# Patient Record
Sex: FEMALE | Race: WHITE | NOT HISPANIC OR LATINO | Employment: FULL TIME | URBAN - METROPOLITAN AREA
[De-identification: names, ages, dates, MRNs, and addresses within clinical notes are randomized per-mention and may not be internally consistent; named-entity substitution may affect disease eponyms.]

---

## 2020-04-15 ENCOUNTER — TELEPHONE (OUTPATIENT)
Dept: URGENT CARE | Facility: CLINIC | Age: 45
End: 2020-04-15

## 2020-04-15 DIAGNOSIS — Z76.0 MEDICATION REFILL: Primary | ICD-10-CM

## 2020-04-15 RX ORDER — GABAPENTIN 600 MG/1
600 TABLET ORAL 3 TIMES DAILY
Qty: 90 TABLET | Refills: 0 | Status: SHIPPED | OUTPATIENT
Start: 2020-04-15 | End: 2020-05-15

## 2020-04-15 NOTE — TELEPHONE ENCOUNTER
This was a patient I evaluated via telemedicine ([facility]). There was an error on the anywhere care platform and I was not able to prescribe gabapentin refill. I have created this encounter to send the rx electronically. See telemedicine note from our encounter.

## 2024-07-26 ENCOUNTER — OFFICE VISIT (OUTPATIENT)
Dept: URGENT CARE | Facility: CLINIC | Age: 49
End: 2024-07-26
Payer: OTHER MISCELLANEOUS

## 2024-07-26 ENCOUNTER — OFFICE VISIT (OUTPATIENT)
Dept: URGENT CARE | Facility: CLINIC | Age: 49
End: 2024-07-26
Payer: COMMERCIAL

## 2024-07-26 VITALS
RESPIRATION RATE: 16 BRPM | DIASTOLIC BLOOD PRESSURE: 64 MMHG | OXYGEN SATURATION: 99 % | HEIGHT: 67 IN | TEMPERATURE: 98 F | BODY MASS INDEX: 31.08 KG/M2 | SYSTOLIC BLOOD PRESSURE: 122 MMHG | HEART RATE: 89 BPM | WEIGHT: 198 LBS

## 2024-07-26 VITALS
HEART RATE: 93 BPM | HEIGHT: 67 IN | BODY MASS INDEX: 30.93 KG/M2 | TEMPERATURE: 98 F | SYSTOLIC BLOOD PRESSURE: 126 MMHG | DIASTOLIC BLOOD PRESSURE: 68 MMHG | WEIGHT: 197.06 LBS | OXYGEN SATURATION: 97 % | RESPIRATION RATE: 18 BRPM

## 2024-07-26 DIAGNOSIS — S16.1XXA STRAIN OF NECK MUSCLE, INITIAL ENCOUNTER: ICD-10-CM

## 2024-07-26 DIAGNOSIS — Z02.6 ENCOUNTER RELATED TO WORKER'S COMPENSATION CLAIM: Primary | ICD-10-CM

## 2024-07-26 DIAGNOSIS — R51.9 ACUTE NONINTRACTABLE HEADACHE, UNSPECIFIED HEADACHE TYPE: ICD-10-CM

## 2024-07-26 RX ORDER — CYCLOBENZAPRINE HCL 5 MG
5 TABLET ORAL 3 TIMES DAILY PRN
Qty: 15 TABLET | Refills: 0 | Status: SHIPPED | OUTPATIENT
Start: 2024-07-26

## 2024-07-26 RX ORDER — BUSPIRONE HYDROCHLORIDE 15 MG/1
TABLET ORAL
COMMUNITY
Start: 2023-09-24

## 2024-07-26 NOTE — PROGRESS NOTES
"Subjective:      Patient ID: Xena Nunn is a 49 y.o. female.    Vitals:  height is 5' 7" (1.702 m) and weight is 89.4 kg (197 lb 1.5 oz). Her oral temperature is 98.3 °F (36.8 °C). Her blood pressure is 126/68 and her pulse is 93. Her respiration is 18 and oxygen saturation is 97%.     Chief Complaint: Shoulder Pain (Right shoulder pain from injury last night at work.)    Patient presents today with right shoulder pain following her catching a patients head from hitting the ground last night at work. She had a shoulder surgery on the right shoulder 8 weeks ago. She states that she took some left over pain meds from surgery and it just put her to sleep.    Shoulder Pain   The pain is present in the right shoulder and neck. This is a new problem. The current episode started yesterday. There has been a history of trauma. The problem occurs constantly. The problem has been unchanged. The quality of the pain is described as sharp. The pain is at a severity of 6/10. The pain is moderate. Associated symptoms include a limited range of motion and stiffness. She has tried cold, NSAIDS and acetaminophen (tylenol, ibuprofen, norco, percocet) for the symptoms. The treatment provided mild relief.     Musculoskeletal:  Positive for abnormal ROM of joint.    Objective:     Physical Exam    Assessment:     No diagnosis found.    Plan:       There are no diagnoses linked to this encounter.                  "

## 2024-07-26 NOTE — PATIENT INSTRUCTIONS
You may return to work with no restrictions.  Restrictions: Regular Duty  Follow up if symptoms worsen or fail to improve.    The medication you have been prescribed may cause drowsiness and impair your judgement. Therefore, you should avoid driving, climbing, using machinery, etc., so as not to increase your risk of injury. Do NOT drink any alcohol while on this medication(s).     Apply RICE technique:   REST, ICE, COMPRESSION, ELEVATION.  Use ice to area for 20 minutes 3-4 times a day for comfort. Can also rotate with use of heat for 20 minutes.   No heavy lifting.  Neck stretches as needed for pain relief.  Take Tylenol and/or Ibuprofen (if applicable) as needed for pain.  Apply analgesic rubs or patches such as biofreeze or Tiger Balm.  Go to ER if you have fever of 103 or higher, numbness, tingling, weakness to lower extremities, worsening HA, confusion, slurred speech or if your symptoms worsen in any way.    Follow up with Orthopedics or primary care within 2 weeks if symptoms do not improve.

## 2024-07-26 NOTE — LETTER
[facility]  [address]  [place] [address]  Phone: [phone]  [facility]: [phone]    Pt Name: Xena Nunn  Injury Date: 07/25/2024   Employee ID: [id number] Date of First Treatment: 07/26/2024   Company: [employer] reference to record [employer]      Appointment Time: 04:20 PM Arrived: 4:25 PM   Provider: Bernice Joseph PA-C Time Out: 5:35 PM     Office Treatment:   1. Encounter related to worker's compensation claim    2. Strain of neck muscle, initial encounter    3. Acute nonintractable headache, unspecified headache type      Medications Ordered This Encounter   Medications    cyclobenzaprine (FLEXERIL) 5 MG tablet

## 2024-07-26 NOTE — PROGRESS NOTES
Subjective:      Patient ID: Xena Nunn is a 49 y.o. female.    Chief Complaint: Shoulder Injury    Patient's place of employment - [employer]  Patient's job title -   Date of injury - 07/25/2024  Body part injured including left or right - right shoulder/neck  Injury Mechanism - pulling  What they were doing when they got hurt - preventing a patient's head from falling   What they did immediately after - told supervisor  Pain scale right now - 6    Patient notes she is mostly having R sided neck pain. Some shoulder pain but very minimal as she had shoulder surgery 2 months ago. Associated sxs include HA. Occipital HA that is dull/aching in nature. Denies vision changes. Denies numbness/tingling. Prior Tx includes 800 mg of ibuprofen, tylenol, and half of unknown opioid from surgery.    Shoulder Injury   The incident occurred at work. The right shoulder is affected. The incident occurred 12 to 24 hours ago. The injury mechanism was a direct blow. The quality of the pain is described as aching. The pain radiates to the right neck. The pain is at a severity of 6/10. The pain is moderate. Pertinent negatives include no chest pain, muscle weakness, numbness or tingling. The symptoms are aggravated by movement and overhead lifting. She has tried acetaminophen, NSAIDs and ice (Narcotic pain meds) for the symptoms. The treatment provided no relief.       Constitution: Negative for chills and fever.   Neck: Positive for neck pain.   Cardiovascular:  Negative for chest pain.   Eyes:  Negative for vision loss, double vision and blurred vision.   Musculoskeletal:  Positive for pain (R shoulder). Negative for trauma, joint pain, joint swelling, abnormal ROM of joint, muscle cramps and muscle ache.   Neurological:  Negative for numbness and tingling.     Objective:     Physical Exam  Vitals and nursing note reviewed.   Constitutional:       General: She is not in acute distress.     Appearance: Normal appearance. She  is not ill-appearing or diaphoretic.   HENT:      Head: Normocephalic and atraumatic.      Right Ear: External ear normal.      Left Ear: External ear normal.      Nose: Nose normal.   Eyes:      General: No scleral icterus.     Extraocular Movements: Extraocular movements intact.      Pupils: Pupils are equal, round, and reactive to light.   Neck:      Comments: R cervical paraspinal tenderness to palpation. No midline tenderness. Full ROM with pain. No rigidity. Negative Kernig's.   Cardiovascular:      Rate and Rhythm: Normal rate and regular rhythm.      Pulses: Normal pulses.      Heart sounds: Normal heart sounds.   Pulmonary:      Effort: Pulmonary effort is normal.      Breath sounds: Normal breath sounds.   Musculoskeletal:         General: No swelling or deformity.      Cervical back: Normal range of motion and neck supple. Tenderness present. No rigidity.      Comments: Mild R shoulder tenderness. Full ROM. No deformity. Sensation intact. 2+ radial pulse. NVI.   Lymphadenopathy:      Cervical: No cervical adenopathy.   Skin:     General: Skin is warm and dry.      Coloration: Skin is not jaundiced.      Findings: No bruising.   Neurological:      General: No focal deficit present.      Mental Status: She is alert.   Psychiatric:         Mood and Affect: Mood normal.        Assessment:      1. Encounter related to worker's compensation claim    2. Strain of neck muscle, initial encounter    3. Acute nonintractable headache, unspecified headache type      Plan:     Encounter related to worker's compensation claim  -     [facility]DOT DR[facility] SCREEN    Strain of neck muscle, initial encounter  -     cyclobenzaprine (FLEXERIL) 5 MG tablet; Take 1 tablet (5 mg total) by mouth 3 (three) times daily as needed for Muscle spasms.  Dispense: 15 tablet; Refill: 0    Acute nonintractable headache, unspecified headache type        Medications Ordered This Encounter   Medications    cyclobenzaprine (FLEXERIL) 5 MG tablet      Sig: Take 1 tablet (5 mg total) by mouth 3 (three) times daily as needed for Muscle spasms.     Dispense:  15 tablet     Refill:  0           Based off of patient's presentation in clinic today, treatment in clinic was necessary. Patient can take OTC Acetaminophen (Tylenol) and/or Ibuprofen (Motrin) as needed for pain relief. Will encourage rest, ice, ibuprofen as well as Flexeril muscle relaxant. Se will be allowed to work regular duty without new restrictions (as she is on a 2lb restriction from previous surgery) starting tomorrow. Patient may RTC as needed.

## 2024-07-28 ENCOUNTER — TELEPHONE (OUTPATIENT)
Dept: URGENT CARE | Facility: CLINIC | Age: 49
End: 2024-07-28
Payer: COMMERCIAL

## 2024-07-28 ENCOUNTER — HOSPITAL ENCOUNTER (OUTPATIENT)
Dept: RADIOLOGY | Facility: CLINIC | Age: 49
Discharge: HOME | End: 2024-07-28
Attending: PHYSICIAN ASSISTANT
Payer: COMMERCIAL

## 2024-07-28 ENCOUNTER — OFFICE VISIT (OUTPATIENT)
Dept: URGENT CARE | Facility: CLINIC | Age: 49
End: 2024-07-28
Payer: OTHER MISCELLANEOUS

## 2024-07-28 VITALS
RESPIRATION RATE: 18 BRPM | DIASTOLIC BLOOD PRESSURE: 69 MMHG | BODY MASS INDEX: 31.08 KG/M2 | TEMPERATURE: 99 F | WEIGHT: 198 LBS | HEIGHT: 67 IN | SYSTOLIC BLOOD PRESSURE: 126 MMHG | OXYGEN SATURATION: 97 % | HEART RATE: 96 BPM

## 2024-07-28 DIAGNOSIS — R07.89 MUSCULOSKELETAL CHEST PAIN: ICD-10-CM

## 2024-07-28 DIAGNOSIS — M89.8X1 PAIN OF RIGHT CLAVICLE: ICD-10-CM

## 2024-07-28 DIAGNOSIS — M25.511 ACUTE PAIN OF RIGHT SHOULDER: Primary | ICD-10-CM

## 2024-07-28 PROCEDURE — 73030 X-RAY EXAM OF SHOULDER: CPT | Mod: RT,S$GLB,, | Performed by: RADIOLOGY

## 2024-07-28 PROCEDURE — 73000 X-RAY EXAM OF COLLAR BONE: CPT | Mod: RT,S$GLB,, | Performed by: RADIOLOGY

## 2024-07-28 RX ORDER — METHOCARBAMOL 750 MG/1
750 TABLET, FILM COATED ORAL 4 TIMES DAILY PRN
Qty: 28 TABLET | Refills: 0 | Status: SHIPPED | OUTPATIENT
Start: 2024-07-28 | End: 2024-07-28 | Stop reason: SDUPTHER

## 2024-07-28 RX ORDER — METHOCARBAMOL 750 MG/1
750 TABLET, FILM COATED ORAL 4 TIMES DAILY PRN
Qty: 28 TABLET | Refills: 0 | Status: SHIPPED | OUTPATIENT
Start: 2024-07-28 | End: 2024-08-04

## 2024-07-28 NOTE — TELEPHONE ENCOUNTER
Call to inform of shoulder and clavicle xray results. No acute findings.ID confirmed with name and . Went over results. No further questions. Pt also req the robaxin be sent to [facility].

## 2024-07-28 NOTE — PROGRESS NOTES
"Subjective:      Patient ID: Xena Nunn is a 49 y.o. female.    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 She was seen here on 7/26 but states she is back requesting xrays. She states that 3 days ago an elderly resident was flailing in wheelchair. The patient herself grabbed the resident with both hands to prevent her from falling. There was a pulling injury mechanism to shoulder and arm. C/o right shoulder pain, right clavicle pain and right chest wall pain. States had neck pain initially but it has resolved. There is no arm numbness,tingling, or weakness. Denies difficulty with ROM. She is concerned bc she had a right shoulder surgery several months ago and wants area evaluated ("grade 5 AC joint separation and distal clavicle fracture")    Shoulder Injury   The incident occurred at work. The right shoulder is affected. The incident occurred 3 to 5 days ago. The injury mechanism was a twisting injury. The quality of the pain is described as stabbing. The pain does not radiate. The pain is at a severity of 6/10. The pain is moderate. Pertinent negatives include no numbness. The symptoms are aggravated by overhead lifting and movement. Treatments tried: tylenol, ibuprofen, flexaril. The treatment provided no relief.       Musculoskeletal:  Positive for pain, trauma and joint pain. Negative for joint swelling, abnormal ROM of joint and history of spine disorder.   Neurological:  Negative for passing out, facial drooping, headaches, numbness and tingling.     Objective:     Physical Exam  Vitals and nursing note reviewed.   Constitutional:       General: She " is not in acute distress.     Appearance: She is well-developed. She is not ill-appearing or toxic-appearing.   HENT:      Head: Normocephalic and atraumatic.      Nose: No congestion.      Mouth/Throat:      Pharynx: No oropharyngeal exudate or posterior oropharyngeal erythema.   Eyes:      Extraocular Movements: Extraocular movements intact.      Pupils: Pupils are equal, round, and reactive to light.   Cardiovascular:      Rate and Rhythm: Normal rate and regular rhythm.      Heart sounds: Normal heart sounds.      Comments: Bilateral UE 2+ radial and ulnar pulses  Pulmonary:      Effort: Pulmonary effort is normal. No respiratory distress.      Breath sounds: Normal breath sounds.   Abdominal:      General: Bowel sounds are normal.      Palpations: Abdomen is soft.      Tenderness: There is no abdominal tenderness. There is no guarding or rebound.   Musculoskeletal:         General: No deformity. Normal range of motion.      Cervical back: Normal range of motion and neck supple.      Comments: 5/5 strength 2+ reflexes bilateral UE    Right shoulder full active and passive ROM. No deformities.    Mid clavicle mild TTP no skin tenting or step offs    Right mid chest wall MSK TTP. No crepitus or deformity     No midline or MSK c spine TTP. FROM C spine    Skin:     General: Skin is warm and dry.   Neurological:      Mental Status: She is alert and oriented to person, place, and time.   Psychiatric:         Behavior: Behavior normal.      XR reviewed and interpreted  by myself - no shoulder dislocation, subluxation, or fx. Old distal clavicle fx. No acute fx     Assessment:      1. Acute pain of right shoulder    2. Pain of right clavicle    3. Musculoskeletal chest pain      Plan:     Discussed xrays. Explained that provider would reach out with offical xray read when available.     Pt is requesting a different muscle relaxer - states flexeril not helping. Robaxin sent in place. Continue ibuprofen.    Medications  Ordered This Encounter   Medications    methocarbamoL (ROBAXIN) 750 MG Tab     Sig: Take 1 tablet (750 mg total) by mouth 4 (four) times daily as needed (spasm).     Dispense:  28 tablet     Refill:  0   Caroline Fusilier PA-C [facility]         Patient Instructions   Continue IBUPROFEN 800MG and Flexeril as needed for muscle spasm.          No follow-ups on file.

## 2024-12-12 ENCOUNTER — ON-DEMAND VIRTUAL (OUTPATIENT)
Dept: URGENT CARE | Facility: CLINIC | Age: 49
End: 2024-12-12
Payer: COMMERCIAL

## 2024-12-12 DIAGNOSIS — R05.9 COUGH, UNSPECIFIED TYPE: ICD-10-CM

## 2024-12-12 DIAGNOSIS — J32.9 SINUSITIS, UNSPECIFIED CHRONICITY, UNSPECIFIED LOCATION: Primary | ICD-10-CM

## 2024-12-12 RX ORDER — BENZONATATE 100 MG/1
100 CAPSULE ORAL 3 TIMES DAILY PRN
Qty: 60 CAPSULE | Refills: 0 | Status: SHIPPED | OUTPATIENT
Start: 2024-12-12 | End: 2025-01-01

## 2024-12-12 RX ORDER — PROMETHAZINE HYDROCHLORIDE AND DEXTROMETHORPHAN HYDROBROMIDE 6.25; 15 MG/5ML; MG/5ML
5 SYRUP ORAL NIGHTLY PRN
Qty: 35 ML | Refills: 0 | Status: SHIPPED | OUTPATIENT
Start: 2024-12-12 | End: 2024-12-19

## 2024-12-12 RX ORDER — AZITHROMYCIN 250 MG/1
TABLET, FILM COATED ORAL
Qty: 6 TABLET | Refills: 0 | Status: SHIPPED | OUTPATIENT
Start: 2024-12-12

## 2024-12-12 NOTE — PROGRESS NOTES
Subjective:      Patient ID: Xena Nunn is a 49 y.o. female.    Vitals:  vitals were not taken for this visit.     Chief Complaint: Cough, Otalgia, and Sinus Problem      Visit Type: TELE AUDIOVISUAL    Present with the patient at the time of consultation: TELEMED PRESENT WITH PATIENT: family member, patient in car    Past Medical History:   Diagnosis Date    Anxiety     Asthma     Essential tremor     Neuropathy      Past Surgical History:   Procedure Laterality Date     SECTION  2010    FACIAL RECONSTRUCTION SURGERY      GASTRIC BYPASS  2003    SHOULDER SURGERY Right 2024    TUBAL LIGATION  2006    TUBAL LIGATION  2011    tubal reversal  2009     Review of patient's allergies indicates:   Allergen Reactions    Bactrim [sulfamethoxazole-trimethoprim]     Doxofylline     Pcn [penicillins]     Sulfa (sulfonamide antibiotics)     Tetracyclines      Current Outpatient Medications on File Prior to Visit   Medication Sig Dispense Refill    busPIRone (BUSPAR) 15 MG tablet       cyclobenzaprine (FLEXERIL) 5 MG tablet Take 1 tablet (5 mg total) by mouth 3 (three) times daily as needed for Muscle spasms. 15 tablet 0    fexofenadine (ALLEGRA) 180 MG tablet Take 180 mg by mouth once daily.      gabapentin (NEURONTIN) 300 MG capsule Take 1 capsule (300 mg total) by mouth 3 (three) times daily. 90 capsule 0    guaifenesin (MUCINEX) 600 mg 12 hr tablet Take 1,200 mg by mouth 2 (two) times daily.      hydrOXYzine HCl (ATARAX) 10 MG Tab TAKE 1 TABLET TWICE PER DAY FOR ANXIETY/ ITCHING  0    lorazepam (ATIVAN) 1 MG tablet TAKE 1 TABLET TWICE A DAY AS NEEDED FOR ANXIETY (Patient not taking: Reported on 2024)  2    nortriptyline (PAMELOR) 50 MG capsule Take 1 capsule (50 mg total) by mouth every evening. (Patient taking differently: Take 50 mg by mouth 2 (two) times daily.) 30 capsule 0    predniSONE (DELTASONE) 5 MG tablet Day 1-2: take 1 tablet by oral route every 6 hours  Day 3-4:  take 1 tablet by oral route every 8 hours  Day 5-6: take 1 tablet by oral route every 12 hours  Day 7-8: take 1 tablet by oral route every in AM (Patient not taking: Reported on 7/26/2024) 20 tablet 0    [DISCONTINUED] hydrocodone-acetaminophen 7.5-325mg (NORCO) 7.5-325 mg per tablet Take 1 tablet by mouth every evening. (Patient not taking: Reported on 7/28/2024) 20 tablet 0    [DISCONTINUED] propranolol (INDERAL) 20 MG tablet Take 1 tablet (20 mg total) by mouth 2 (two) times daily. (Patient not taking: Reported on 7/26/2024) 60 tablet 1    [DISCONTINUED] trazodone (DESYREL) 150 MG tablet Take 1 tablet (150 mg total) by mouth nightly as needed for Insomnia (sleep). (Patient not taking: Reported on 7/26/2024) 30 tablet 0     No current facility-administered medications on file prior to visit.     Family History   Problem Relation Name Age of Onset    Cancer Mother          uteran?    Heart disease Father      Hyperlipidemia Father      Hypertension Father      Diabetes Father         Medications Ordered                [id number] - [facility] [address] [place] [address] [facility]   [address] [place] [address] [facility] [address]    Telephone: [phone]   Fax: [phone]   Hours: Not open 24 hours                         E-Prescribed (3 of 3)              azithromycin (Z-DOMINIK) 250 MG tablet    Sig: Take 2 tablets by mouth on day 1; Take 1 tablet by mouth on days 2-5       Start: 12/12/24     Quantity: 6 tablet Refills: 0                         benzonatate (TESSALON) 100 MG capsule    Sig: Take 1 capsule (100 mg total) by mouth 3 (three) times daily as needed for Cough. May take 1-2 caps 3 times daily as needed.       Start: 12/12/24     Quantity: 60 capsule Refills: 0                         promethazine-dextromethorphan (PROMETHAZINE-DM) 6.25-15 mg/5 mL Syrp    Sig: Take 5 mLs by mouth nightly as needed (cough).       Start: 12/12/24     Quantity: 35 mL Refills: 0                            [facility] Peq Odvv Intake    12/12/2024 12:51 PM CST - Filed by Patient   What is your current physical address in the event of a medical emergency? [address] [facility] [place]   Are you able to take your vital signs? No   Please attach any relevant images or files    Is your employer contracted with [facility]? Yes   Please enter your employer supplied coupon code. [employer]         1.5 weeks of sinus pain/pressure, ear pain and cough. Worsening symptoms. + sick contacts at home. COVID negative. No relief with OTC meds.    Cough  Associated symptoms include ear pain and headaches. Pertinent negatives include no fever, myalgias, sore throat, shortness of breath or wheezing.   Otalgia   Associated symptoms include coughing and headaches. Pertinent negatives include no diarrhea, sore throat or vomiting.   Sinus Problem  Associated symptoms include congestion, coughing, ear pain, headaches and sinus pressure. Pertinent negatives include no shortness of breath or sore throat.       Constitution: Negative for fever.   HENT:  Positive for ear pain, congestion, sinus pain and sinus pressure. Negative for sore throat, trouble swallowing and voice change.    Respiratory:  Positive for cough. Negative for sputum production, shortness of breath and wheezing.    Gastrointestinal:  Negative for nausea, vomiting and diarrhea.   Musculoskeletal:  Negative for muscle ache.   Neurological:  Positive for headaches.        Objective:   The physical exam was conducted virtually.  Physical Exam   Constitutional: She is oriented to person, place, and time. She does not appear ill. No distress.   HENT:   Head: Normocephalic and atraumatic.   Nose: Right sinus exhibits maxillary sinus tenderness. Left sinus exhibits maxillary sinus tenderness.   Eyes: Extraocular movement intact   Pulmonary/Chest: Effort normal.   Abdominal: Normal appearance.   Musculoskeletal: Normal range of motion.         General: Normal  range of motion.   Neurological: no focal deficit. She is alert and oriented to person, place, and time.   Psychiatric: Her behavior is normal. Mood normal.   Vitals reviewed.      Assessment:     1. Sinusitis, unspecified chronicity, unspecified location    2. Cough, unspecified type        Plan:   Patient encouraged to monitor symptoms closely and instructed to follow-up for new or worsening symptoms. Further, in-person, evaluation may be necessary for continued treatment. Please follow up with your primary care doctor or specialist as needed. Verbally discussed plan. Patient confirms understanding and is in agreement with treatment and plan.     You must understand that you've received a [facility] Care evaluation only and that you may be released before all your medical problems are known or treated. You, the patient, will arrange for follow up care as instructed.      Sinusitis, unspecified chronicity, unspecified location  -     azithromycin (Z-DOMINIK) 250 MG tablet; Take 2 tablets by mouth on day 1; Take 1 tablet by mouth on days 2-5  Dispense: 6 tablet; Refill: 0    Cough, unspecified type  -     benzonatate (TESSALON) 100 MG capsule; Take 1 capsule (100 mg total) by mouth 3 (three) times daily as needed for Cough. May take 1-2 caps 3 times daily as needed.  Dispense: 60 capsule; Refill: 0  -     promethazine-dextromethorphan (PROMETHAZINE-DM) 6.25-15 mg/5 mL Syrp; Take 5 mLs by mouth nightly as needed (cough).  Dispense: 35 mL; Refill: 0        Patient Instructions   OVER THE COUNTER RECOMMENDATIONS/SUGGESTIONS (IF NO CONTRAINDICATIONS).     ·         Make sure to stay well hydrated.     ·         Use Nasal Saline to mechanically move any post nasal drip from your eustachian tube or from the back of your throat.     ·         Use warm saltwater gargles to ease your throat pain. Warm saltwater gargles as needed for sore throat-  1/2 tsp salt to 1 cup warm water, gargle as desired. Warm fluids tend to relieve a  sore throat.     .         Throat lozenges, Chloraseptic spray or other over the counter treatments are ok to use as well. Use as directed.     ·         Use an antihistamine such as Claritin, Zyrtec or Allegra to dry you out.     ·         Use pseudoephedrine (behind the counter) to decongest. Pseudoephedrine  30 mg up to 240 mg /day. It can raise your blood pressure and give you palpitations.     ·         Use Mucinex (guaifenesin) to break up mucous up to 2400mg/day to loosen any mucous.     ·         The Mucinex DM pill has a cough suppressant that can be sedating. It can be used at night to stop the tickle at the back of your throat.     ·         You can use Mucinex D (it has guaifenesin and a high dose of pseudoephedrine) in the mornings to help decongest.     ·         Use Afrin (oxymetazoline) in each nare for no longer than 3 days, as it is addictive. It can also dry out your mucous membranes and cause elevated blood pressure. This is especially useful if you are flying.     ·         Use Flonase 1-2 sprays/nostril per day. It is a local acting steroid nasal spray, if you develop a bloody nose, stop using the medication immediately.     ·         Sometimes Nyquil at night is beneficial to help you get some rest, however it is sedating, and it does have an antihistamine, and Tylenol.     ·         Honey is a natural cough suppressant that can be used.     ·         Tylenol up to 4,000 mg a day is safe for short periods and can be used for body aches, pain, and fever. However, in high doses and prolonged use it can cause liver irritation.     ·         Ibuprofen is a non-steroidal anti-inflammatory that can be used for body aches, pain, and fever. However, it can also cause stomach irritation if overused.